# Patient Record
Sex: MALE | Race: BLACK OR AFRICAN AMERICAN
[De-identification: names, ages, dates, MRNs, and addresses within clinical notes are randomized per-mention and may not be internally consistent; named-entity substitution may affect disease eponyms.]

---

## 2019-01-14 ENCOUNTER — HOSPITAL ENCOUNTER (EMERGENCY)
Dept: HOSPITAL 61 - ER | Age: 49
Discharge: HOME | End: 2019-01-14
Payer: SELF-PAY

## 2019-01-14 VITALS — SYSTOLIC BLOOD PRESSURE: 139 MMHG | DIASTOLIC BLOOD PRESSURE: 86 MMHG

## 2019-01-14 VITALS — HEIGHT: 76 IN | BODY MASS INDEX: 31.05 KG/M2 | WEIGHT: 255 LBS
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
## 2019-01-14 DIAGNOSIS — S29.012A: Primary | ICD-10-CM

## 2019-01-14 DIAGNOSIS — G89.29: ICD-10-CM
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
## 2019-01-14 DIAGNOSIS — I10: ICD-10-CM

## 2019-01-14 DIAGNOSIS — Z88.0: ICD-10-CM
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
## 2019-01-14 DIAGNOSIS — F17.200: ICD-10-CM

## 2019-01-14 LAB
ALBUMIN SERPL-MCNC: 3.7 G/DL (ref 3.4–5)
ALBUMIN/GLOB SERPL: 1.2 {RATIO} (ref 1–1.7)
ALP SERPL-CCNC: 67 U/L (ref 46–116)
ALT SERPL-CCNC: 32 U/L (ref 16–63)
ANION GAP SERPL CALC-SCNC: 11 MMOL/L (ref 6–14)
AST SERPL-CCNC: 18 U/L (ref 15–37)
BASOPHILS # BLD AUTO: 0 X10^3/UL (ref 0–0.2)
BASOPHILS NFR BLD: 1 % (ref 0–3)
BILIRUB SERPL-MCNC: 0.6 MG/DL (ref 0.2–1)
BUN SERPL-MCNC: 24 MG/DL (ref 8–26)
BUN/CREAT SERPL: 24 (ref 6–20)
CALCIUM SERPL-MCNC: 8.9 MG/DL (ref 8.5–10.1)
CHLORIDE SERPL-SCNC: 106 MMOL/L (ref 98–107)
CO2 SERPL-SCNC: 22 MMOL/L (ref 21–32)
CREAT SERPL-MCNC: 1 MG/DL (ref 0.7–1.3)
EOSINOPHIL NFR BLD: 0.4 X10^3/UL (ref 0–0.7)
EOSINOPHIL NFR BLD: 6 % (ref 0–3)
ERYTHROCYTE [DISTWIDTH] IN BLOOD BY AUTOMATED COUNT: 12.9 % (ref 11.5–14.5)
GFR SERPLBLD BASED ON 1.73 SQ M-ARVRAT: 96.5 ML/MIN
GLOBULIN SER-MCNC: 3.1 G/DL (ref 2.2–3.8)
GLUCOSE SERPL-MCNC: 102 MG/DL (ref 70–99)
HCT VFR BLD CALC: 43 % (ref 39–53)
HGB BLD-MCNC: 15.2 G/DL (ref 13–17.5)
LYMPHOCYTES # BLD: 1.5 X10^3/UL (ref 1–4.8)
LYMPHOCYTES NFR BLD AUTO: 25 % (ref 24–48)
MCH RBC QN AUTO: 33 PG (ref 25–35)
MCHC RBC AUTO-ENTMCNC: 35 G/DL (ref 31–37)
MCV RBC AUTO: 93 FL (ref 79–100)
MONO #: 0.5 X10^3/UL (ref 0–1.1)
MONOCYTES NFR BLD: 8 % (ref 0–9)
NEUT #: 3.6 X10^3UL (ref 1.8–7.7)
NEUTROPHILS NFR BLD AUTO: 60 % (ref 31–73)
PLATELET # BLD AUTO: 165 X10^3/UL (ref 140–400)
POTASSIUM SERPL-SCNC: 4.8 MMOL/L (ref 3.5–5.1)
PROT SERPL-MCNC: 6.8 G/DL (ref 6.4–8.2)
RBC # BLD AUTO: 4.65 X10^6/UL (ref 4.3–5.7)
SODIUM SERPL-SCNC: 139 MMOL/L (ref 136–145)
WBC # BLD AUTO: 5.9 X10^3/UL (ref 4–11)

## 2019-01-14 PROCEDURE — 93005 ELECTROCARDIOGRAM TRACING: CPT

## 2019-01-14 PROCEDURE — 80053 COMPREHEN METABOLIC PANEL: CPT

## 2019-01-14 PROCEDURE — 85025 COMPLETE CBC W/AUTO DIFF WBC: CPT

## 2019-01-14 PROCEDURE — 84484 ASSAY OF TROPONIN QUANT: CPT

## 2019-01-14 PROCEDURE — 36415 COLL VENOUS BLD VENIPUNCTURE: CPT

## 2019-01-14 PROCEDURE — 85379 FIBRIN DEGRADATION QUANT: CPT

## 2019-01-14 RX ADMIN — NITROGLYCERIN PRN MG: 0.4 TABLET SUBLINGUAL at 12:31

## 2019-01-14 NOTE — PHYS DOC
Past Medical History


Past Medical History:  Hypertension, Other


Additional Past Medical Histor:  "LITTLE MI",ANGER ISSUES


Past Surgical History:  Angioplasty, Other


Additional Past Surgical Histo:  CARDIAC STENTS


Additional Information:  


1 PPD


Alcohol Use:  None


Drug Use:  None





Adult General


Chief Complaint


Chief Complaint:  BACK PAIN - NO INJURY





HPI


HPI


Patient is a 48  year old male with mid thoracic pain acute onset starting last 

night awaking from sleep. Pain radiates to anterior chest. It is worse with 

palpation movement but not with deep breathing. Patient reports history of 

chronic low back pain which is different than current episode. Patient does 

have history of CAD with stent placement. Denies anterior chest pain, shortness 

of breath, nausea, sweats her pain radiating to neck, shoulder or jaw. Pain is 

nonexertional. No other acute symptoms or complaints.[]





Review of Systems


Review of Systems


ROS as per HPI


All other systems were reviewed and found to be within normal limits, except as 

documented in this note.





Current Medications


Current Medications





Current Medications








 Medications


  (Trade)  Dose


 Ordered  Sig/Vita  Start Time


 Stop Time Status Last Admin


Dose Admin


 


 Morphine Sulfate


  (Morphine


 Sulfate)  4 mg  1X  ONCE  1/14/19 13:30


 1/14/19 13:31 DC  





 


 Nitroglycerin


  (Nitrostat)  0.4 mg  PRN Q5MIN  PRN  1/14/19 12:00


    1/14/19 12:31


0.4 MG


 


 Ondansetron HCl


  (Zofran)  4 mg  1X  ONCE  1/14/19 13:30


 1/14/19 13:31 DC  














Allergies


Allergies





Allergies








Coded Allergies Type Severity Reaction Last Updated Verified


 


  Penicillins Allergy Intermediate  1/14/19 Yes











Physical Exam


Physical Exam





Constitutional: Well developed, well nourished, no acute distress, non-toxic 

appearance. []


HENT: Normocephalic, atraumatic, bilateral external ears normal, oropharynx 

moist. []


Eyes: PERRLA, EOMI, conjunctiva normal. [] 


Neck: Normal range of motion. [] 


Cardiovascular:Heart rate regular rhythm, no murmur []


Lungs & Thorax:  Bilateral breath sounds clear to auscultation. []


Abdomen: Bowel sounds normal, soft, no tenderness. [] 


Skin: Warm, dry, no erythema, no rash. [] 


Back: Mid thoracic back pain/tenderness reproducing symptoms or complaints.. [] 


Extremities: No tenderness. [] 


Neurologic: Alert and oriented X 3, normal motor function, normal sensory 

function, no focal deficits noted. []


Psychologic: Affect normal, judgement normal, mood normal. []





Current Patient Data


Vital Signs





 Vital Signs








  Date Time  Temp Pulse Resp B/P (MAP) Pulse Ox O2 Delivery O2 Flow Rate FiO2


 


1/14/19 12:31  71  138/83    


 


1/14/19 11:57 98.1  22  94 Room Air  





 98.1       








Lab Values





 Laboratory Tests








Test


 1/14/19


12:11


 


White Blood Count


 5.9 x10^3/uL


(4.0-11.0)


 


Red Blood Count


 4.65 x10^6/uL


(4.30-5.70)


 


Hemoglobin


 15.2 g/dL


(13.0-17.5)


 


Hematocrit


 43.0 %


(39.0-53.0)


 


Mean Corpuscular Volume


 93 fL ()





 


Mean Corpuscular Hemoglobin 33 pg (25-35)  


 


Mean Corpuscular Hemoglobin


Concent 35 g/dL


(31-37)


 


Red Cell Distribution Width


 12.9 %


(11.5-14.5)


 


Platelet Count


 165 x10^3/uL


(140-400)


 


Neutrophils (%) (Auto) 60 % (31-73)  


 


Lymphocytes (%) (Auto) 25 % (24-48)  


 


Monocytes (%) (Auto) 8 % (0-9)  


 


Eosinophils (%) (Auto) 6 % (0-3)  H


 


Basophils (%) (Auto) 1 % (0-3)  


 


Neutrophils # (Auto)


 3.6 x10^3uL


(1.8-7.7)


 


Lymphocytes # (Auto)


 1.5 x10^3/uL


(1.0-4.8)


 


Monocytes # (Auto)


 0.5 x10^3/uL


(0.0-1.1)


 


Eosinophils # (Auto)


 0.4 x10^3/uL


(0.0-0.7)


 


Basophils # (Auto)


 0.0 x10^3/uL


(0.0-0.2)


 


D-Dimer (Chelsea)


 < 0.27


ug/mlFEU


 


Sodium Level


 139 mmol/L


(136-145)


 


Potassium Level


 4.8 mmol/L


(3.5-5.1)


 


Chloride Level


 106 mmol/L


()


 


Carbon Dioxide Level


 22 mmol/L


(21-32)


 


Anion Gap 11 (6-14)  


 


Blood Urea Nitrogen


 24 mg/dL


(8-26)


 


Creatinine


 1.0 mg/dL


(0.7-1.3)


 


Estimated GFR


(Cockcroft-Gault) 96.5  





 


BUN/Creatinine Ratio 24 (6-20)  H


 


Glucose Level


 102 mg/dL


(70-99)  H


 


Calcium Level


 8.9 mg/dL


(8.5-10.1)


 


Total Bilirubin


 0.6 mg/dL


(0.2-1.0)


 


Aspartate Amino Transferase


(AST) 18 U/L (15-37)





 


Alanine Aminotransferase (ALT)


 32 U/L (16-63)





 


Alkaline Phosphatase


 67 U/L


()


 


Troponin I Quantitative


 < 0.017 ng/mL


(0.000-0.055)


 


Total Protein


 6.8 g/dL


(6.4-8.2)


 


Albumin


 3.7 g/dL


(3.4-5.0)


 


Albumin/Globulin Ratio 1.2 (1.0-1.7)  





 Laboratory Tests


1/14/19 12:11








 Laboratory Tests


1/14/19 12:11














EKG


EKG


[EKG: Normal sinus rhythm, no acute ST-T wave changes.]





Radiology/Procedures


Radiology/Procedures


[]





Course & Med Decision Making


Course & Med Decision Making


Pertinent Labs and Imaging studies reviewed. (See chart for details)





[Reproducible nonexertional back pain neurologic compromise. No findings of 

coronary syndrome, dissection, PE, etc... Will treat supportively with the 

follow-up. Return precautions reviewed.]





Dragon Disclaimer


Dragon Disclaimer


This electronic medical record was generated, in whole or in part, using a 

voice recognition dictation system.





Departure


Departure


Impression:  


 Primary Impression:  


 Acute thoracic myofascial strain


Disposition:  01 HOME, SELF-CARE


Condition:  GOOD


Patient Instructions:  Thoracic Strain, Easy-to-Read





Additional Instructions:  


You were elevated in the emergency department for back pain. Lab work, EKG were 

performed and are nondiagnostic. Your exam is most consistent with 

musculoskeletal back pain. Please limit heavy lifting and strenuous physical 

activity as tolerated. Take ibuprofen for pain, hydrocodone and Flexeril as 

needed for additional relief.


Scripts


Cyclobenzaprine Hcl (CYCLOBENZAPRINE HCL) 10 Mg Tablet


10 MG PO TID PRN for MODERATE PAIN for 10 Days, #30 TAB


   Prov: CALVIN LATHAM DO         1/14/19 


Hydrocodone/Apap  (NORCO  TABLET) 1 Each Tablet


1 TAB PO Q8HRS PRN for PAIN MDD 6, #15 TAB 0 Refills


   Prov: CALVIN LATHAM DO         1/14/19











CALVIN LATHAM DO Jan 14, 2019 14:13

## 2019-01-14 NOTE — EKG
[facility]

              [address] [place] [address]

Test Date:    2019               Test Time:    11:43:24

Pat Name:     CHRISTI CARL           Department:   

Patient ID:   [id number]           Room:          

Gender:       M                        Technician:   

:          1970               Requested By: CALVIN LATHAM

Order Number: [id number]           Reading MD:   Irwin Ortiz MD

                                 Measurements

Intervals                              Axis          

Rate:         78                       P:            30

CO:           138                      QRS:          14

QRSD:         96                       T:            24

QT:           370                                    

QTc:          425                                    

                           Interpretive Statements

SINUS RHYTHM



Electronically Signed On 1- 12:15:11 CST by Irwin Ortiz MD